# Patient Record
(demographics unavailable — no encounter records)

---

## 2021-06-25 NOTE — CT REPORT
PROCEDURE:  MAXILLOFACIAL W

 

INDICATIONS:    facial pain

 

CONTRAST:  IV CONTRAST: Optiray 320 ml: 100 PO CONTRAST: *NO PO CONTRAST 

 

TECHNIQUE:  

After the administration of intravenous contrast, 3.0 mm axial sections acquired from the mid-neck to
 the frontal sinuses, with coronal reformatting.  For radiation dose reduction, the following was use
d:  automated exposure control, adjustment of mA and/or kV according to patient size.

 

COMPARISON:  None.

 

FINDINGS:  

Image quality:  Excellent.  

 

Soft tissues:  No edema, masses, or fluid collections.  No enlarged lymph nodes.  

 

Vascular:  Visualized vascular structures appear patent throughout.  Bony vascular foramina and canal
s appear normal.  

 

Bones:  Facial bones appear intact, without fractures, erosions, or destruction.  Visualized portions
 of the skull base and auditory canals also appear normal.  

 

Presentation: Advanced carious and periodontal dental disease noted

 

Sinuses:  Paranasal sinuses are aerated without fluid levels, mucosal thickening, or mucoceles.  Mast
oid air cells are aerated.  

 

IMPRESSION:  

 

1. Advanced carious and periodontal dental disease. Otherwise unremarkable maxillofacial CT with cont
rast.

 

Reviewed by: Leroy Siegel MD on 6/25/2021 1:13 PM [facility]

Approved by: Leroy Siegel MD on 6/25/2021 1:13 PM [facility]

 

 

Station ID:  [id number]

## 2021-06-25 NOTE — ED PHYSICIAN DOCUMENTATION
History of Present Illness





- Stated complaint


Stated Complaint: MIGRAINE





- Chief complaint


Chief Complaint: Heent





- History obtained from


History obtained from: Patient





- Additonal information


Additional information: 





43-year-old woman with "situational schizophrenia," gets monthly Abilify 

injections.  Otherwise healthy.  15 months ago was having some dental work done 

and was told she needed a biopsy of a lesion in a bone, looks like her maxilla 

where she points.  She was lost to follow-up, but had slowly progressive pain in

the area of the right maxilla.  A few months ago pain was getting worse so was 

seen at the [facility] and given antibiotics for potential dental infection 

which helped briefly and transiently.  Otherwise the pain continued to be 

progressive.  Now over the last 2 days she has severe stabbing pain in the right

upper maxilla and deep behind the eye.  It is associated with blurry vision and 

some sinus congestion and drainage from the eye this morning.  No associated 

fevers.  No active dental pain.  Pain radiates to the right ear.  She is 

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





Review of Systems


Constitutional: denies: Fever, Chills


Eyes: reports: Reviewed and negative


Ears: reports: Reviewed and negative


Nose: reports: Reviewed and negative


Throat: reports: Reviewed and negative





PD PAST MEDICAL HISTORY





- Present Medications


Home Medications: 


                                Ambulatory Orders











 Medication  Instructions  Recorded  Confirmed


 


ARIPiprazole INJ [Abilify Maintena]  06/25/21 06/25/21


 


Amox/Clav 875/125 [Augmentin] 1 each PO Q12H #20 tablet 06/25/21 


 


Ferrous Sulfate 325 mg PO TID #360 tab 06/25/21 














- Allergies


Allergies/Adverse Reactions: 


                                    Allergies











Allergy/AdvReac Type Severity Reaction Status Date / Time


 


erythromycin base Allergy  Emesis Verified 06/25/21 12:15














PD ED PE NORMAL





- Vitals


Vital signs reviewed: Yes





- General


General: Alert and oriented X 3, Other (Appears to be in pain)





- HEENT


HEENT: Other (Pupils are equally round and reactive, extraocular movements are 

normal.  Globes are soft.  Generally poor dentition but no obvious active 

infection.  Mostly edentulous. Both TMs occluded with cerumen.)





- Neck


Neck: Supple, no meningeal sign, No bony TTP





- Neuro


Neuro: Alert and oriented X 3, CNs 2-12 intact, No motor deficit, No sensory 

deficit, Normal speech


Eye Opening: Spontaneous


Motor: Obeys Commands


Verbal: Oriented


GCS Score: 15





- Psych


Psych: Normal mood, Normal affect





Results





- Vitals


Vitals: 


                               Vital Signs - 24 hr











  06/25/21





  12:15


 


Temperature 36.5 C


 


Heart Rate 77


 


Respiratory 16





Rate 


 


Blood Pressure 160/90 H


 


O2 Saturation 100








                                     Oxygen











O2 Source                      Room air

















- Labs


Labs: 


                                Laboratory Tests











  06/25/21 06/25/21 06/25/21





  12:54 12:54 12:54


 


WBC  4.5 L  


 


RBC  4.22  


 


Hgb  8.1 L  


 


Hct  27.6 L  


 


MCV  65.4 L  


 


MCH  19.2 L  


 


MCHC  29.3 L  


 


RDW  20.2 H  


 


Plt Count  360  


 


MPV  8.7  


 


Neut # (Auto)  2.7  


 


Lymph # (Auto)  1.3 L  


 


Mono # (Auto)  0.4  


 


Eos # (Auto)  0.1  


 


Baso # (Auto)  0.0  


 


Absolute Nucleated RBC  0.00  


 


Nucleated RBC %  0.0  


 


Manual Slide Review  Indicated  


 


WBC Morphology  NORMAL APPEARANCE  


 


Platelet Estimate  NORMAL (130-450,000)  


 


Platelet Morphology  NORMAL APPEARANCE  


 


RBC Morph Micro Appear  2+ MICROCYTOSIS  


 


Sodium   136 


 


Potassium   3.4 L 


 


Chloride   103 


 


Carbon Dioxide   27 


 


Anion Gap   6.0 


 


BUN   22 H 


 


Creatinine   0.6 


 


Estimated GFR (MDRD)   109 


 


Glucose   107 H 


 


Calcium   9.0 


 


Iron    18 L


 


TIBC    480 H


 


% Saturation    4 L


 


Transferrin    343














PD MEDICAL DECISION MAKING





- ED course


ED course: 





43-year-old woman presents with facial pain, the pattern is atypical and 

progressive and given the association with the previously known lesion in the 

bones of the face, this is concerning for malignancy.





We trialed some Imitrex but she really did not have any change with that, this 

was tried because cluster headache was considered.  The diagnosis of trigeminal 

neuralgia was also considered, but she did not notice any change with brushing 

of the face or touching of the face etc.





CT just showed carious teeth.  This may be her source and she was referred to 

[facility].  Her teeth are basically at the point where she probably just needs them 

all extracted and dentures.





Departure





- Departure


Disposition: 01 Home, Self Care


Clinical Impression: 


 Pain due to dental caries, Facial pain





Condition: Good


Record reviewed to determine appropriate education?: Yes


Instructions:  ED Tooth Pain


Follow-Up: 


[facility] [Physician No Access] - 


Prescriptions: 


Amox/Clav 875/125 [Augmentin] 1 each PO Q12H #20 tablet


Ferrous Sulfate 325 mg PO TID #360 tab


Comments: 








You are fairly anemic, this is unrelated to your pain today I think though.  

Your hemoglobin is 8.1, hematocrit 27.6, MCV 65.4.  Start the iron supplements, 

make sure your primary care physician knows about this, they will likely want to

 do repeat labs and potentially other testing.





For the facial pain and dental issue, follow-up with the oral maxillofacial 

surgeon.  His numbers on this form.  Call for an appointment.